# Patient Record
Sex: MALE
[De-identification: names, ages, dates, MRNs, and addresses within clinical notes are randomized per-mention and may not be internally consistent; named-entity substitution may affect disease eponyms.]

---

## 2021-11-04 PROBLEM — Z00.00 ENCOUNTER FOR PREVENTIVE HEALTH EXAMINATION: Status: ACTIVE | Noted: 2021-11-04

## 2021-11-10 ENCOUNTER — APPOINTMENT (OUTPATIENT)
Dept: ORTHOPEDIC SURGERY | Facility: CLINIC | Age: 56
End: 2021-11-10
Payer: COMMERCIAL

## 2021-11-10 DIAGNOSIS — M75.42 IMPINGEMENT SYNDROME OF LEFT SHOULDER: ICD-10-CM

## 2021-11-10 DIAGNOSIS — M67.912 UNSPECIFIED DISORDER OF SYNOVIUM AND TENDON, LEFT SHOULDER: ICD-10-CM

## 2021-11-10 DIAGNOSIS — Z78.9 OTHER SPECIFIED HEALTH STATUS: ICD-10-CM

## 2021-11-10 DIAGNOSIS — M75.41 IMPINGEMENT SYNDROME OF RIGHT SHOULDER: ICD-10-CM

## 2021-11-10 PROCEDURE — 99203 OFFICE O/P NEW LOW 30 MIN: CPT

## 2021-11-10 PROCEDURE — 73030 X-RAY EXAM OF SHOULDER: CPT | Mod: LT,RT

## 2021-11-10 RX ORDER — MELOXICAM 15 MG/1
15 TABLET ORAL DAILY
Qty: 20 | Refills: 1 | Status: ACTIVE | COMMUNITY
Start: 2021-11-10 | End: 1900-01-01

## 2021-11-10 NOTE — HISTORY OF PRESENT ILLNESS
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  Pain is intermittent and variable worse with lifting and tennis and activity.  He had seen one doctor but no imaging was done.  He tried some electric stim but did not seem to help.  He cannot lift the same amount of weights as he used to.  He had done physical therapy in the past but not recently.  Ibuprofen does help his pain.  No specific injuries to the shoulder.  Over the summer he had changed his jeny tennis swing to try to get more top spin and that seemed to aggravate the shoulder.\par He does lift a lot of weights as well 6 days a week

## 2021-11-10 NOTE — ASSESSMENT
[FreeTextEntry1] : 56-year-old with chronic right greater than left shoulder pain with bilateral glenohumeral arthritis more severe on the left than right shoulder but less symptomatic likely because it is his nondominant arm.  Playing tennis and lifting weights has aggravated the arthritis.  He could also have some rotator cuff pathology or long head of the biceps pathology given his symptoms.\par I recommended first trying a lot of rest really backing off on his activities, doing physical therapy and taking a course of an anti-inflammatory which was prescribed.  Heat and ice as needed.  If it still not getting better I would like for him to get an MRI and then will discuss that and see what our next steps are.  He understands that we cannot cure the arthritis and it can progress over time.\par He will follow-up depending on how he is doing.

## 2021-11-10 NOTE — PHYSICAL EXAM
[UE] : Sensory: Intact in bilateral upper extremities [Rad] : radial 2+ and symmetric bilaterally [Normal RUE] : Right Upper Extremity: No scars, rashes, lesions, ulcers, skin intact [Normal LUE] : Left Upper Extremity: No scars, rashes, lesions, ulcers, skin intact [Normal Touch] : sensation intact for touch [Normal] : Gait: normal [Shoulder Motion On Adduction] : negative AC provocation [Shoulder Instability] : negative Anterior-Posterior Slide [Shoulder Instab Anterior Apprehension (Crank) Test Left] : negative Apprehension test [Shoulder Muscle Weakness Supraspinatus Left Only] : negative Drop Arm test [Pain Left Shoulder Active Forw Flexion Against Resistance] : negative Empty Can test [Shoulder Pain During Dean-Junior Impingement Test Left] : negative Dean test [Shoulder Pain Elicited During Neer Impingement Test Left] : negative Neer test [Shoulder Pain Elicited During Pushmataha's Test Left] : negative Dunham's test [Active Abduction Left Shoulder Decreased] : negative Painful Arc [Tender On Palpation Bicipital Groove Speed's Test Left] : negative Speed's test [de-identified] : \par  [FreeTextEntry2] : \par No edema, ecchymoses, erythema.\par There may be subtly more step-off left than right AC joint but no tenderness at the AC joints.\par Tender anterior shoulder joint.\par Shoulder range of motion is with forward elevation to 180 degrees with just mild discomfort at full elevation.  Internal rotation to T10 bilaterally.\par Passively there is about 50 degrees external rotation with the arms at the side.\par In 90 degrees abduction there is about 80 degrees external rotation and 30 degrees internal rotation.\par 5/5 supraspinatus, internal rotation, external rotation and biceps but pain mildly with internal rotation.\par Very positive speeds on the right. [de-identified] : \par x-rays of bilateral shoulders AP, Y lateral and axillary views today showed degenerative changes bilateral shoulders glenohumeral joint worse on the left than right with moderately severe joint space narrowing left on the axillary view and more mild to moderate on the right.\par Inferior humeral head osteophytes.\par Some widening at the AC joint bilaterally which may be from some osteolysis of the distal clavicle from the weightlifting.

## 2021-12-02 ENCOUNTER — APPOINTMENT (OUTPATIENT)
Dept: ORTHOPEDIC SURGERY | Facility: CLINIC | Age: 56
End: 2021-12-02
Payer: COMMERCIAL

## 2021-12-02 DIAGNOSIS — M67.921 UNSPECIFIED DISORDER OF SYNOVIUM AND TENDON, RIGHT UPPER ARM: ICD-10-CM

## 2021-12-02 DIAGNOSIS — M19.011 PRIMARY OSTEOARTHRITIS, RIGHT SHOULDER: ICD-10-CM

## 2021-12-02 DIAGNOSIS — M19.012 PRIMARY OSTEOARTHRITIS, RIGHT SHOULDER: ICD-10-CM

## 2021-12-02 DIAGNOSIS — M67.911 UNSPECIFIED DISORDER OF SYNOVIUM AND TENDON, RIGHT SHOULDER: ICD-10-CM

## 2021-12-02 PROCEDURE — 99213 OFFICE O/P EST LOW 20 MIN: CPT

## 2021-12-02 NOTE — HISTORY OF PRESENT ILLNESS
[de-identified] : Adrian comes in for follow-up for his right shoulder.\par He took the Mobic consistently 15 mg/day and decreased to going to the gym and decreased shoulder exercises but then did do some light lifting and rotator cuff strengthening and exercises he learned when he went to physical therapy a few years ago.  The pain in his shoulder had been about 7 out of 10 and now is about 3 out of 10.  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  He did have a deep massage which seemed really helpful.

## 2021-12-02 NOTE — ASSESSMENT
[FreeTextEntry1] : 56-year-old with bilateral glenohumeral arthritis more severe on the left than right shoulder but less symptomatic left likely because it is his nondominant arm.  With rest and the anti-inflammatories he has had some good relief of pain and its more mild now.  He is going to try playing some tennis but not do any serves or hit too long or hard.  He will continue with strengthening.  He may try to get in some physical therapy to see if that helps and continue with the massage which helped.  He will cut down on the anti-inflammatories and consider taking 7.5 mg of meloxicam as needed or just taking 15 mg as needed.\par Other potential future options may include a steroid or hyaluronic acid injection.  If or when the arthritis becomes more symptomatic then replacement could be considered.\par He will follow-up as needed.

## 2021-12-02 NOTE — PHYSICAL EXAM
[UE] : Sensory: Intact in bilateral upper extremities [Rad] : radial 2+ and symmetric bilaterally [Shoulder Motion On Adduction] : negative AC provocation [Shoulder Instability] : negative Anterior-Posterior Slide [Shoulder Instab Anterior Apprehension (Crank) Test Left] : negative Apprehension test [Shoulder Muscle Weakness Supraspinatus Left Only] : negative Drop Arm test [Pain Left Shoulder Active Forw Flexion Against Resistance] : negative Empty Can test [Shoulder Pain During Dean-Junior Impingement Test Left] : negative Dean test [Shoulder Pain Elicited During Neer Impingement Test Left] : negative Neer test [Shoulder Pain Elicited During Ada's Test Left] : negative Dunham's test [Active Abduction Left Shoulder Decreased] : negative Painful Arc [Tender On Palpation Bicipital Groove Speed's Test Left] : negative Speed's test [Normal RUE] : Right Upper Extremity: No scars, rashes, lesions, ulcers, skin intact [Normal LUE] : Left Upper Extremity: No scars, rashes, lesions, ulcers, skin intact [Normal Touch] : sensation intact for touch [Normal] : Oriented to person, place, and time, insight and judgement were intact and the affect was normal [de-identified] : \par  [FreeTextEntry2] : \par No edema, ecchymoses, erythema.\par Tender mildly but much less compared to last visit anterior shoulder joint.\par Shoulder range of motion is with forward elevation to 180 degrees with just mild discomfort at full elevation.  Internal rotation to T10 bilaterally.\par Passively there is about 50 degrees external rotation with the arms at the side.\par In 90 degrees abduction there is about 80 degrees external rotation and 30 degrees internal rotation.\par 5/5 supraspinatus, internal rotation, external rotation and biceps but pain mildly with internal rotation.\par Discomfort with speeds [de-identified] : \par x-rays of bilateral shoulders AP, Y lateral and axillary views November 10, 2021 showed degenerative changes bilateral shoulders glenohumeral joint worse on the left than right with moderately severe joint space narrowing left on the axillary view and more mild to moderate on the right.\par Inferior humeral head osteophytes.\par Some widening at the AC joint bilaterally which may be from some osteolysis of the distal clavicle from the weightlifting.

## 2021-12-02 NOTE — REASON FOR VISIT
[Follow-Up Visit] : a follow-up visit for [Shoulder Pain] : shoulder pain [Shoulder Arthritis] : shoulder arthritis

## 2022-03-10 ENCOUNTER — APPOINTMENT (OUTPATIENT)
Dept: ORTHOPEDIC SURGERY | Facility: CLINIC | Age: 57
End: 2022-03-10

## 2022-06-13 ENCOUNTER — EMERGENCY (EMERGENCY)
Facility: HOSPITAL | Age: 57
LOS: 1 days | Discharge: HOME | End: 2022-06-13
Attending: EMERGENCY MEDICINE | Admitting: EMERGENCY MEDICINE
Payer: COMMERCIAL

## 2022-06-13 VITALS
RESPIRATION RATE: 20 BRPM | OXYGEN SATURATION: 96 % | DIASTOLIC BLOOD PRESSURE: 94 MMHG | HEART RATE: 92 BPM | SYSTOLIC BLOOD PRESSURE: 149 MMHG | TEMPERATURE: 98 F

## 2022-06-13 VITALS
DIASTOLIC BLOOD PRESSURE: 94 MMHG | OXYGEN SATURATION: 100 % | RESPIRATION RATE: 18 BRPM | WEIGHT: 160.06 LBS | TEMPERATURE: 98 F | HEART RATE: 102 BPM | SYSTOLIC BLOOD PRESSURE: 170 MMHG

## 2022-06-13 DIAGNOSIS — F12.929 CANNABIS USE, UNSPECIFIED WITH INTOXICATION, UNSPECIFIED: ICD-10-CM

## 2022-06-13 DIAGNOSIS — D72.829 ELEVATED WHITE BLOOD CELL COUNT, UNSPECIFIED: ICD-10-CM

## 2022-06-13 DIAGNOSIS — Z20.822 CONTACT WITH AND (SUSPECTED) EXPOSURE TO COVID-19: ICD-10-CM

## 2022-06-13 DIAGNOSIS — R47.81 SLURRED SPEECH: ICD-10-CM

## 2022-06-13 DIAGNOSIS — E87.6 HYPOKALEMIA: ICD-10-CM

## 2022-06-13 LAB
ALBUMIN SERPL ELPH-MCNC: 4.6 G/DL — SIGNIFICANT CHANGE UP (ref 3.3–5)
ALP SERPL-CCNC: 83 U/L — SIGNIFICANT CHANGE UP (ref 40–120)
ALT FLD-CCNC: 16 U/L — SIGNIFICANT CHANGE UP (ref 10–45)
ANION GAP SERPL CALC-SCNC: 16 MMOL/L — SIGNIFICANT CHANGE UP (ref 5–17)
APTT BLD: 27.3 SEC — LOW (ref 27.5–35.5)
AST SERPL-CCNC: 26 U/L — SIGNIFICANT CHANGE UP (ref 10–40)
BASOPHILS # BLD AUTO: 0.11 K/UL — SIGNIFICANT CHANGE UP (ref 0–0.2)
BASOPHILS NFR BLD AUTO: 0.8 % — SIGNIFICANT CHANGE UP (ref 0–2)
BILIRUB SERPL-MCNC: 0.4 MG/DL — SIGNIFICANT CHANGE UP (ref 0.2–1.2)
BUN SERPL-MCNC: 21 MG/DL — SIGNIFICANT CHANGE UP (ref 7–23)
CALCIUM SERPL-MCNC: 9.4 MG/DL — SIGNIFICANT CHANGE UP (ref 8.4–10.5)
CHLORIDE SERPL-SCNC: 99 MMOL/L — SIGNIFICANT CHANGE UP (ref 96–108)
CO2 SERPL-SCNC: 23 MMOL/L — SIGNIFICANT CHANGE UP (ref 22–31)
CREAT SERPL-MCNC: 1.39 MG/DL — HIGH (ref 0.5–1.3)
EGFR: 60 ML/MIN/1.73M2 — SIGNIFICANT CHANGE UP
EOSINOPHIL # BLD AUTO: 0 K/UL — SIGNIFICANT CHANGE UP (ref 0–0.5)
EOSINOPHIL NFR BLD AUTO: 0 % — SIGNIFICANT CHANGE UP (ref 0–6)
GLUCOSE SERPL-MCNC: 96 MG/DL — SIGNIFICANT CHANGE UP (ref 70–99)
HCT VFR BLD CALC: 37.8 % — LOW (ref 39–50)
HGB BLD-MCNC: 13.1 G/DL — SIGNIFICANT CHANGE UP (ref 13–17)
INR BLD: 1 — SIGNIFICANT CHANGE UP (ref 0.88–1.16)
LACTATE SERPL-SCNC: 3.4 MMOL/L — HIGH (ref 0.5–2)
LYMPHOCYTES # BLD AUTO: 41.4 % — SIGNIFICANT CHANGE UP (ref 13–44)
LYMPHOCYTES # BLD AUTO: 5.63 K/UL — HIGH (ref 1–3.3)
MANUAL SMEAR VERIFICATION: SIGNIFICANT CHANGE UP
MCHC RBC-ENTMCNC: 30 PG — SIGNIFICANT CHANGE UP (ref 27–34)
MCHC RBC-ENTMCNC: 34.7 GM/DL — SIGNIFICANT CHANGE UP (ref 32–36)
MCV RBC AUTO: 86.7 FL — SIGNIFICANT CHANGE UP (ref 80–100)
MONOCYTES # BLD AUTO: 0.71 K/UL — SIGNIFICANT CHANGE UP (ref 0–0.9)
MONOCYTES NFR BLD AUTO: 5.2 % — SIGNIFICANT CHANGE UP (ref 2–14)
NEUTROPHILS # BLD AUTO: 7.03 K/UL — SIGNIFICANT CHANGE UP (ref 1.8–7.4)
NEUTROPHILS NFR BLD AUTO: 51.7 % — SIGNIFICANT CHANGE UP (ref 43–77)
OVALOCYTES BLD QL SMEAR: SLIGHT — SIGNIFICANT CHANGE UP
PCP SPEC-MCNC: SIGNIFICANT CHANGE UP
PLAT MORPH BLD: NORMAL — SIGNIFICANT CHANGE UP
PLATELET # BLD AUTO: 257 K/UL — SIGNIFICANT CHANGE UP (ref 150–400)
POIKILOCYTOSIS BLD QL AUTO: SLIGHT — SIGNIFICANT CHANGE UP
POTASSIUM SERPL-MCNC: 3.2 MMOL/L — LOW (ref 3.5–5.3)
POTASSIUM SERPL-SCNC: 3.2 MMOL/L — LOW (ref 3.5–5.3)
PROT SERPL-MCNC: 6.9 G/DL — SIGNIFICANT CHANGE UP (ref 6–8.3)
PROTHROM AB SERPL-ACNC: 11.9 SEC — SIGNIFICANT CHANGE UP (ref 10.5–13.4)
RBC # BLD: 4.36 M/UL — SIGNIFICANT CHANGE UP (ref 4.2–5.8)
RBC # FLD: 12.2 % — SIGNIFICANT CHANGE UP (ref 10.3–14.5)
RBC BLD AUTO: ABNORMAL
SARS-COV-2 RNA SPEC QL NAA+PROBE: NEGATIVE — SIGNIFICANT CHANGE UP
SODIUM SERPL-SCNC: 138 MMOL/L — SIGNIFICANT CHANGE UP (ref 135–145)
TROPONIN T SERPL-MCNC: 0.01 NG/ML — SIGNIFICANT CHANGE UP (ref 0–0.01)
VARIANT LYMPHS # BLD: 0.9 % — SIGNIFICANT CHANGE UP (ref 0–6)
WBC # BLD: 13.59 K/UL — HIGH (ref 3.8–10.5)
WBC # FLD AUTO: 13.59 K/UL — HIGH (ref 3.8–10.5)

## 2022-06-13 PROCEDURE — 99291 CRITICAL CARE FIRST HOUR: CPT

## 2022-06-13 PROCEDURE — 70450 CT HEAD/BRAIN W/O DYE: CPT | Mod: 26,MA

## 2022-06-13 PROCEDURE — 93010 ELECTROCARDIOGRAM REPORT: CPT | Mod: 59

## 2022-06-13 PROCEDURE — 71045 X-RAY EXAM CHEST 1 VIEW: CPT | Mod: 26

## 2022-06-13 PROCEDURE — 99284 EMERGENCY DEPT VISIT MOD MDM: CPT

## 2022-06-13 RX ORDER — SODIUM CHLORIDE 9 MG/ML
1000 INJECTION INTRAMUSCULAR; INTRAVENOUS; SUBCUTANEOUS ONCE
Refills: 0 | Status: COMPLETED | OUTPATIENT
Start: 2022-06-13 | End: 2022-06-13

## 2022-06-13 RX ADMIN — SODIUM CHLORIDE 1000 MILLILITER(S): 9 INJECTION INTRAMUSCULAR; INTRAVENOUS; SUBCUTANEOUS at 23:40

## 2022-06-13 NOTE — ED PROVIDER NOTE - PROGRESS NOTE DETAILS
stroke code upon arrival. last seen nl 30min pta. fs wnl. sbp 170s. hr 100. ED ct injector broken. pt taken to 3rd floor. cards fellow aware. upon return from ct, pt repeatedly states "time is distorted." upon further questioning w/ family, daughter at home discovered "cannabis infused" gummy/chocolate product that pt reports was a gift from one of his pt. pt states he did not realize it was a cannabis edible. stroke code upon arrival. last seen nl 30min pta. fs wnl. sbp 170s. hr 100. ED ct injector broken. pt taken to 3rd floor. cards fellow consulted for ekg review.

## 2022-06-13 NOTE — ED PROVIDER NOTE - OBJECTIVE STATEMENT
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 +distal extremity paresthesia x4. pt states "something is wrong. I feel like elly been poisoned or something." at baseline just prior. no fever/chills, no ha, no neck pain/stiffness, no photophobia/vision changes, no uri/cough, no cp/sob, no abd pain/n/v, no prior covid, no trauma, no etoh-dpt/ivdu, no phx acs/mi vs tia/cva, no antiplatelet/AC. 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 +distal extremity paresthesia x4. pt states "something is wrong. I feel like elly been poisoned or something." at baseline just prior. no fever/chills, no ha, no neck pain/stiffness, no photophobia/vision changes, no uri/cough, no cp/sob, no abd pain/n/v, no prior covid, no trauma, no etoh-dpt/ivdu, no recreational drugs/accidental ingestions, no overdose, no phx acs/mi vs tia/cva, no antiplatelet/AC.

## 2022-06-13 NOTE — ED PROVIDER NOTE - PHYSICAL EXAMINATION
CONST: anxious appearing speaking in full slowed slightly slurred sentences  HEAD: atraumatic  EYES: conjunctivae clear, PERRL, EOMI  ENT: mmm  NECK: supple/FROM  CARD: rrr no murmurs  CHEST: ctab no r/r/w  ABD: soft, nd, nttp, no rebound/guarding  EXT: FROM, symmetric distal pulses intact  SKIN: warm, dry, no rash, no pedal edema/ttp/rash, cap refill <2sec  NEURO: a+ox3, ***, 5/5 strength x4, gross sensation intact x4

## 2022-06-13 NOTE — ED PROVIDER NOTE - PATIENT PORTAL LINK FT
You can access the FollowMyHealth Patient Portal offered by [facility] by registering at the following website: [email or web address]. By joining [facility]’s FollowMyHealth portal, you will also be able to view your health information using other applications (apps) compatible with our system.

## 2022-06-13 NOTE — ED PROVIDER NOTE - CLINICAL SUMMARY MEDICAL DECISION MAKING FREE TEXT BOX
stroke code upon arrival. fs wnl. pt initially denied ingestion. subsequently confirms cannabis edible ingestion. +thc on utox. nonspecific leukocytosis w/o L shift. no significant anemia. mild hypokalemia s/p repletion. low risk by wells. ddimer neg. trop wnl. ekg w/o significant st/t changes. cxr w/o acute focal consol vs ptx vs pulm edema vs widened mediastinum. ct brain w/o acute abnl. stroke cleared. improved sx. clinically sober. steady gait. feels safe going home w/ son. no indication for inpatient hospitalization at this time. will dc w/ outpatient pcp fu. strict return precautions. pt/son agrees w/ plan. questions answered.

## 2022-06-13 NOTE — ED PROVIDER NOTE - NSFOLLOWUPINSTRUCTIONS_ED_ALL_ED_FT
REST AND REMAIN HYDRATED.     PLEASE FOLLOW-UP WITH YOUR PCP AS NEEDED.    ANY IMAGING RESULTS GIVEN IN THE EMERGENCY DEPARTMENT ARE PRELIMINARY UNTIL FORMALLY READ BY A RADIOLOGIST. YOU WILL BE CONTACTED IF THERE ARE ANY SIGNIFICANT CHANGES IN THE FINAL READ.    PLEASE RETURN TO THE EMERGENCY DEPARTMENT IF FEVER, CHEST PAIN, SHORTNESS OF BREATH, ABDOMINAL PAIN, VOMITING, OTHER CONCERNING SYMPTOMS.    PLEASE CONTACT JUAN MANUEL DUCKWORTH ([facility] EMERGENCY DEPARTMENT CLINICAL REFERRAL COORDINATOR) TO ASSIST IN SCHEDULING YOUR FOLLOW-UP APPOINTMENT.    Monday - Friday 11am-7pm  [phone]  [email or web address]

## 2022-06-14 LAB
AMPHET UR-MCNC: NEGATIVE — SIGNIFICANT CHANGE UP
BARBITURATES UR SCN-MCNC: NEGATIVE — SIGNIFICANT CHANGE UP
BENZODIAZ UR-MCNC: NEGATIVE — SIGNIFICANT CHANGE UP
COCAINE METAB.OTHER UR-MCNC: NEGATIVE — SIGNIFICANT CHANGE UP
LACTATE SERPL-SCNC: 0.9 MMOL/L — SIGNIFICANT CHANGE UP (ref 0.5–2)
METHADONE UR-MCNC: NEGATIVE — SIGNIFICANT CHANGE UP
OPIATES UR-MCNC: NEGATIVE — SIGNIFICANT CHANGE UP
PCP UR-MCNC: NEGATIVE — SIGNIFICANT CHANGE UP
THC UR QL: POSITIVE

## 2022-06-14 PROCEDURE — 84484 ASSAY OF TROPONIN QUANT: CPT

## 2022-06-14 PROCEDURE — 99291 CRITICAL CARE FIRST HOUR: CPT | Mod: 25

## 2022-06-14 PROCEDURE — 83605 ASSAY OF LACTIC ACID: CPT

## 2022-06-14 PROCEDURE — 93005 ELECTROCARDIOGRAM TRACING: CPT

## 2022-06-14 PROCEDURE — 83735 ASSAY OF MAGNESIUM: CPT

## 2022-06-14 PROCEDURE — 36415 COLL VENOUS BLD VENIPUNCTURE: CPT

## 2022-06-14 PROCEDURE — 87635 SARS-COV-2 COVID-19 AMP PRB: CPT

## 2022-06-14 PROCEDURE — 85610 PROTHROMBIN TIME: CPT

## 2022-06-14 PROCEDURE — 82550 ASSAY OF CK (CPK): CPT

## 2022-06-14 PROCEDURE — 85025 COMPLETE CBC W/AUTO DIFF WBC: CPT

## 2022-06-14 PROCEDURE — 85730 THROMBOPLASTIN TIME PARTIAL: CPT

## 2022-06-14 PROCEDURE — 80307 DRUG TEST PRSMV CHEM ANLYZR: CPT

## 2022-06-14 PROCEDURE — 85379 FIBRIN DEGRADATION QUANT: CPT

## 2022-06-14 PROCEDURE — 70450 CT HEAD/BRAIN W/O DYE: CPT | Mod: MA

## 2022-06-14 PROCEDURE — 82962 GLUCOSE BLOOD TEST: CPT

## 2022-06-14 PROCEDURE — 80053 COMPREHEN METABOLIC PANEL: CPT

## 2022-06-14 PROCEDURE — 82553 CREATINE MB FRACTION: CPT

## 2022-06-14 PROCEDURE — 83880 ASSAY OF NATRIURETIC PEPTIDE: CPT

## 2022-06-14 PROCEDURE — 71045 X-RAY EXAM CHEST 1 VIEW: CPT

## 2022-06-14 RX ORDER — POTASSIUM CHLORIDE 20 MEQ
40 PACKET (EA) ORAL ONCE
Refills: 0 | Status: COMPLETED | OUTPATIENT
Start: 2022-06-14 | End: 2022-06-14

## 2022-06-14 RX ADMIN — Medication 40 MILLIEQUIVALENT(S): at 00:36

## 2022-06-14 NOTE — CONSULT NOTE ADULT - SUBJECTIVE AND OBJECTIVE BOX
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 Patient was in [facility] [place], saw patients in clinic, made a house call and went running today. After returning home from walking the dog, patient sat down and afterwards felt "off", as if he was going to vasovagal and having difficulty assessing passage of time. On presentation. /94. He describes his symptoms as if his body was going to go through the stretcher bed and his sense of events and timing was off (he knows that he had a CT scan, arrived in the ED etc, but whether that happened 5 mins ago vs 15 mins ago was hard for him to assess). Speech was mildly slurred, easily understandable. Son at bedside said that he speech is more spaced out that normal. Patient is otherwise a healthy individual. Denies etoh, smoking     T(C): 36.7 (06-13-22 @ 23:54), Max: 36.7 (06-13-22 @ 22:52)  HR: 92 (06-13-22 @ 23:54) (92 - 102)  BP: 149/94 (06-13-22 @ 23:54) (149/94 - 170/94)  RR: 20 (06-13-22 @ 23:54) (18 - 20)  SpO2: 96% (06-13-22 @ 23:54) (96% - 100%)    PAST MEDICAL & SURGICAL HISTORY:      FAMILY HISTORY:      SOCIAL HISTORY:   Patient lives with *** at ***.   Smoking status:  Drinking:  Drug Use:     ROS: ***  Constitutional: No fever, weight loss or fatigue  Eyes: No eye pain, visual disturbances, or discharge  ENMT:  No difficulty hearing, tinnitus; No sinus or throat pain  Neck: No pain or stiffness  Respiratory: No cough, wheezing, chills or hemoptysis  Cardiovascular: No chest pain, palpitations, shortness of breath, or leg swelling  Gastrointestinal: No abdominal pain. No nausea, vomiting or hematemesis; No diarrhea or constipation. Nohematochezia.  Genitourinary: No dysuria, frequency, hematuria or incontinence  Neurological: As per HPI  Skin: No itching, burning, rashes or lesions   Endocrine: No heat or cold intolerance; No hair loss  Musculoskeletal: No joint pain or swelling; No muscle, back or extremity pain  Heme/Lymph: No easy bruising or bleeding gums    MEDICATIONS  (STANDING):  potassium chloride   Powder 40 milliEquivalent(s) Oral Once    MEDICATIONS  (PRN):    Allergies    No Known Allergies    Intolerances      Vital Signs Last 24 Hrs  T(C): 36.7 (13 Jun 2022 23:54), Max: 36.7 (13 Jun 2022 22:52)  T(F): 98 (13 Jun 2022 23:54), Max: 98 (13 Jun 2022 22:52)  HR: 92 (13 Jun 2022 23:54) (92 - 102)  BP: 149/94 (13 Jun 2022 23:54) (149/94 - 170/94)  BP(mean): --  RR: 20 (13 Jun 2022 23:54) (18 - 20)  SpO2: 96% (13 Jun 2022 23:54) (96% - 100%)    Physical exam:  Constitutional: No acute distress, conversant  Eyes: Anicteric sclerae, moist conjunctivae, see below for CNs  Neck: trachea midline, FROM, supple, no thyromegaly or lymphadenopathy  Cardiovascular: Regular rate and rhythm, no murmurs, rubs, or gallops. No carotid bruits.   Pulmonary: Anterior breath sounds clear bilaterally, no crackles or wheezing. No use of accessory muscles  GI: Abdomen soft, non-distended, non-tender  Extremities: Radial and DP pulses +2, no edema    Neurologic:  -Mental status: Awake, alert, oriented to person, place, and time. Speech is fluent with intact naming, repetition, and comprehension, no dysarthria. Recent and remote memory intact. Follows commands. Attention/concentration intact. Fund of knowledge appropriate.  -Cranial nerves:   II: Visual fields are full to confrontation.  III, IV, VI: Extraocular movements are intact without nystagmus. Pupils equally round and reactive to light  V:  Facial sensation V1-V3 equal and intact   VII: Face is symmetric with normal eye closure and smile  VIII: Hearing is bilaterally intact to finger rub  IX, X: Uvula is midline and soft palate rises symmetrically  XI: Head turning and shoulder shrug are intact.  XII: Tongue protrudes midline  Motor: Normal bulk and tone. No pronator drift. Strength bilateral upper extremity 5/5, bilateral lower extremities 5/5.  Rapid alternating movements intact and symmetric  Sensation: Intact to light touch bilaterally. No neglect or extinction on double simultaneous testing.  Coordination: No dysmetria on finger-to-nose and heel-to-shin bilaterally  Reflexes: Downgoing toes bilaterally   Gait: Narrow gait and steady    NIHSS: **** ASPECT Score: ***** ICH Score: ****** (GCS)    Fingerstick Blood Glucose: CAPILLARY BLOOD GLUCOSE  94 (14 Jun 2022 00:03)      POCT Blood Glucose.: 94 mg/dL (13 Jun 2022 22:42)    LABS:                        13.1   13.59 )-----------( 257      ( 13 Jun 2022 22:59 )             37.8     06-13    138  |  99  |  21  ----------------------------<  96  3.2<L>   |  23  |  1.39<H>    Ca    9.4      13 Jun 2022 22:59  Mg     2.2     06-13    TPro  6.9  /  Alb  4.6  /  TBili  0.4  /  DBili  x   /  AST  26  /  ALT  16  /  AlkPhos  83  06-13    PT/INR - ( 13 Jun 2022 22:59 )   PT: 11.9 sec;   INR: 1.00          PTT - ( 13 Jun 2022 22:59 )  PTT:27.3 sec  CARDIAC MARKERS ( 13 Jun 2022 22:59 )  x     / 0.01 ng/mL / 180 U/L / x     / 2.0 ng/mL          RADIOLOGY & ADDITIONAL STUDIES:      -----------------------------------------------------------------------------------------------------------------  IV-tPA (Y/N):    ***                              Bolus time:    Alteplase Dose Verification w/ RN:  Reason IV-tPA not given: ***    **STROKE CODE CONSULT NOTE**    Last known well time/Time of onset of symptoms: 6/13/22 10:15PM    HPI: 56y Male with no pmhx, does not take any medications, 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 Patient was in [facility] [place], saw patients in clinic, made a house call and went running today. After returning home from walking the dog, patient sat down and afterwards felt "off", as if he was going to vasovagal and having difficulty assessing passage of time. On presentation. /94. He describes his symptoms as if his body was going to go through the stretcher bed and his sense of events and timing was off (he knows that he had a CT scan, arrived in the ED etc, but whether that happened 5 mins ago vs 15 mins ago was hard for him to assess). Speech was mildly slurred, easily understandable. Son at bedside said that he speech is more spaced out that normal. He felt like he was having a panic attack although he has never had one before, no pmhx of anxiety.  Patient is otherwise a healthy individual. CTH negative for acute large stroke and hemorrhage. CTP/CTA deferred as low suspicion for LVO, contrast shortage. Family saw package of cannabis infused gummy on kitchen table that was part of a gift for the patient. Patient confirms that he did eat some with no knowledge that they were cannabis infused.     T(C): 36.7 (06-13-22 @ 23:54), Max: 36.7 (06-13-22 @ 22:52)  HR: 92 (06-13-22 @ 23:54) (92 - 102)  BP: 149/94 (06-13-22 @ 23:54) (149/94 - 170/94)  RR: 20 (06-13-22 @ 23:54) (18 - 20)  SpO2: 96% (06-13-22 @ 23:54) (96% - 100%)    PAST MEDICAL & SURGICAL HISTORY:      FAMILY HISTORY:      SOCIAL HISTORY:   Patient lives at home with wife and children.  Smoking status: denies  Drinking: Occasional etoh use  Drug Use: denies    ROS:   Constitutional: No fever, weight loss or fatigue  Eyes: No eye pain, visual disturbances, or discharge  ENMT:  No difficulty hearing, tinnitus; No sinus or throat pain  Neck: No pain or stiffness  Respiratory: No cough, wheezing, chills or hemoptysis  Cardiovascular: No chest pain, palpitations, shortness of breath, or leg swelling  Gastrointestinal: No abdominal pain. No nausea, vomiting or hematemesis; No diarrhea or constipation. Nohematochezia.  Genitourinary: No dysuria, frequency, hematuria or incontinence  Neurological: As per HPI    MEDICATIONS  (STANDING):  potassium chloride   Powder 40 milliEquivalent(s) Oral Once    MEDICATIONS  (PRN):    Allergies    No Known Allergies    Intolerances      Vital Signs Last 24 Hrs  T(C): 36.7 (13 Jun 2022 23:54), Max: 36.7 (13 Jun 2022 22:52)  T(F): 98 (13 Jun 2022 23:54), Max: 98 (13 Jun 2022 22:52)  HR: 92 (13 Jun 2022 23:54) (92 - 102)  BP: 149/94 (13 Jun 2022 23:54) (149/94 - 170/94)  BP(mean): --  RR: 20 (13 Jun 2022 23:54) (18 - 20)  SpO2: 96% (13 Jun 2022 23:54) (96% - 100%)    Physical exam:  Constitutional: No acute distress, conversant  Eyes: Anicteric sclerae, moist conjunctivae, see below for CNs  Neck: trachea midline, FROM, supple, no thyromegaly or lymphadenopathy  Cardiovascular: Regular rate and rhythm on tele   Pulmonary: No increased work of breathing. No use of accessory muscles  Extremities: no edema    Neurologic:  -Mental status: Awake, alert, oriented to person, place, and time. Speech is fluent with intact naming, repetition, and comprehension, mild dysarthria, easily comprehensible. Recent and remote memory intact. Follows commands. Attention/concentration intact. Fund of knowledge appropriate.  -Cranial nerves:   II: Visual fields are full to confrontation.  III, IV, VI: Extraocular movements are intact without nystagmus. Pupils equally round and reactive to light  V:  Facial sensation V1-V3 equal and intact   VII: Face is symmetric with normal eye closure and smile  VIII: Hearing is grossly intact bilaterally  XII: Tongue protrudes midline  Motor: Normal bulk and tone. No pronator drift. Strength bilateral upper extremity 5/5, bilateral lower extremities 5/5.  Sensation: Intact to light touch bilaterally. No neglect or extinction on double simultaneous testing.  Coordination: No dysmetria on finger-to-nose bilaterally    NIHSS: 1    Fingerstick Blood Glucose: CAPILLARY BLOOD GLUCOSE  94 (14 Jun 2022 00:03)      POCT Blood Glucose.: 94 mg/dL (13 Jun 2022 22:42)    LABS:                        13.1   13.59 )-----------( 257      ( 13 Jun 2022 22:59 )             37.8     06-13    138  |  99  |  21  ----------------------------<  96  3.2<L>   |  23  |  1.39<H>    Ca    9.4      13 Jun 2022 22:59  Mg     2.2     06-13    TPro  6.9  /  Alb  4.6  /  TBili  0.4  /  DBili  x   /  AST  26  /  ALT  16  /  AlkPhos  83  06-13    PT/INR - ( 13 Jun 2022 22:59 )   PT: 11.9 sec;   INR: 1.00          PTT - ( 13 Jun 2022 22:59 )  PTT:27.3 sec  CARDIAC MARKERS ( 13 Jun 2022 22:59 )  x     / 0.01 ng/mL / 180 U/L / x     / 2.0 ng/mL          RADIOLOGY & ADDITIONAL STUDIES:  < from: CT Brain Stroke Protocol (06.13.22 @ 23:06) >  IMPRESSION:  No intracranial hemorrhage or demarcated transcortical   infarction.    < end of copied text >      -----------------------------------------------------------------------------------------------------------------  IV-tPA (Y/N):    N                              Bolus time:    Alteplase Dose Verification w/ RN:  Reason IV-tPA not given: accidental ingestion of cannabis

## 2022-06-14 NOTE — CONSULT NOTE ADULT - ASSESSMENT
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 Initially concern for neurologic etiology, stroke code called. CTH negative for large acute infarct, no hemorrhage. CTP/CTA deferred due to contrast shortage, low suspicion for LVO. On further questioning, family/son discovered package of cannabis infused gummies on kitchen counter that patient had eaten without knowledge of cannabis content. Symptoms better explained by cannabis ingestion rather than stroke as patient with no other neurological findings, no risk factors and healthy individual.     - no further stroke work up needed  - supportive care per ED    Case discussed with Neurology Attending Dr. 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 Initially concern for neurologic etiology, stroke code called. CTH negative for large acute infarct, no hemorrhage. CTP/CTA deferred due to contrast shortage, low suspicion for LVO. On further questioning, family/son discovered package of cannabis infused gummies on kitchen counter that patient had eaten without knowledge of cannabis content. Symptoms better explained by cannabis ingestion rather than stroke as patient with no other neurological findings, no risk factors and healthy individual.     - no further stroke work up needed  - supportive care per ED    Case discussed with Neurology Attending Dr. Gaytan     ------------  Gait assessed prior to discharge, narrow and steady. Patient symptoms improved.

## 2024-02-20 NOTE — ED ADULT NURSE NOTE - OBJECTIVE STATEMENT
1st attempt- called x 2, line rings busy.      Patient to the Hospital with complaint of slow speech, unsteady gait and reports loss of time.